# Patient Record
Sex: FEMALE
[De-identification: names, ages, dates, MRNs, and addresses within clinical notes are randomized per-mention and may not be internally consistent; named-entity substitution may affect disease eponyms.]

---

## 2022-05-06 ENCOUNTER — HOSPITAL ENCOUNTER (OUTPATIENT)
Dept: HOSPITAL 90 - RAH | Age: 67
End: 2022-05-06
Attending: PHYSICIAN ASSISTANT
Payer: MEDICARE

## 2022-05-06 DIAGNOSIS — M48.061: Primary | ICD-10-CM

## 2022-05-06 DIAGNOSIS — M47.816: ICD-10-CM

## 2022-05-06 PROCEDURE — 72114 X-RAY EXAM L-S SPINE BENDING: CPT

## 2023-02-09 ENCOUNTER — HOSPITAL ENCOUNTER (OUTPATIENT)
Dept: HOSPITAL 90 - RAH | Age: 68
Discharge: HOME | End: 2023-02-09
Attending: FAMILY MEDICINE
Payer: MEDICARE

## 2023-02-09 DIAGNOSIS — R10.11: ICD-10-CM

## 2023-02-09 DIAGNOSIS — N64.4: Primary | ICD-10-CM

## 2023-02-09 PROCEDURE — 76705 ECHO EXAM OF ABDOMEN: CPT

## 2023-02-14 ENCOUNTER — HOSPITAL ENCOUNTER (OUTPATIENT)
Dept: HOSPITAL 90 - RAH | Age: 68
Discharge: HOME | End: 2023-02-14
Attending: FAMILY MEDICINE
Payer: MEDICARE

## 2023-02-14 DIAGNOSIS — N64.4: Primary | ICD-10-CM

## 2023-04-05 ENCOUNTER — HOSPITAL ENCOUNTER (OUTPATIENT)
Dept: HOSPITAL 90 - RAH | Age: 68
Discharge: HOME | End: 2023-04-05
Attending: PHYSICIAN ASSISTANT
Payer: MEDICARE

## 2023-04-05 DIAGNOSIS — M48.07: ICD-10-CM

## 2023-04-05 DIAGNOSIS — M54.50: ICD-10-CM

## 2023-04-05 DIAGNOSIS — M43.16: Primary | ICD-10-CM

## 2023-04-05 DIAGNOSIS — M41.26: ICD-10-CM

## 2023-04-05 PROCEDURE — 72110 X-RAY EXAM L-2 SPINE 4/>VWS: CPT

## 2025-03-03 ENCOUNTER — HOSPITAL ENCOUNTER (EMERGENCY)
Dept: HOSPITAL 90 - EDH | Age: 70
Discharge: HOME | End: 2025-03-03
Payer: MEDICARE

## 2025-03-03 VITALS
TEMPERATURE: 98.6 F | RESPIRATION RATE: 20 BRPM | HEART RATE: 74 BPM | OXYGEN SATURATION: 100 % | DIASTOLIC BLOOD PRESSURE: 74 MMHG | SYSTOLIC BLOOD PRESSURE: 126 MMHG

## 2025-03-03 VITALS — WEIGHT: 160.06 LBS | HEIGHT: 63 IN | BODY MASS INDEX: 28.36 KG/M2

## 2025-03-03 DIAGNOSIS — E78.00: ICD-10-CM

## 2025-03-03 DIAGNOSIS — E11.9: ICD-10-CM

## 2025-03-03 DIAGNOSIS — I10: ICD-10-CM

## 2025-03-03 DIAGNOSIS — M54.32: Primary | ICD-10-CM

## 2025-03-03 PROCEDURE — 96372 THER/PROPH/DIAG INJ SC/IM: CPT

## 2025-03-03 PROCEDURE — 99284 EMERGENCY DEPT VISIT MOD MDM: CPT

## 2025-03-03 RX ADMIN — TRIAMCINOLONE ACETONIDE ONE MG: 40 INJECTION, SUSPENSION INTRA-ARTICULAR; INTRAMUSCULAR at 22:53

## 2025-03-03 RX ADMIN — ORPHENADRINE CITRATE ONE MG: 30 INJECTION INTRAMUSCULAR; INTRAVENOUS at 22:53

## 2025-03-03 NOTE — ERN
ED Note


History of Present Illness


Stated Complaint:  PAIN, HIGH BP


Chief Complaint:  Lower Extremity Pain/Injury


Time Seen by MD:  20:02


Time Seen by Midlevel:  20:02


Dictation:


The patient is a 69-year-old female with a history of diabetes, hypertension who

presents to the emergency department with left lower leg pain.  Patient reports 

that she has been dealing with this sciatic nerve pain for over a year but today

got worse.  Reports she has been seeing her primary doctor for this problem but 

is pending review of results of imaging with .  Patient denies any recent 

traumas, denies any urinary or fecal incontinence


Allergies:  


Coded Allergies:  


     No Known Allergies (Unverified  Allergy, Unknown, 3/3/25)





Past Medical History


Past Medical History:  Diabetes-Type II, High Cholesterol, Hypertension


Surgical History:  Unknown


RN Note Reviewed/Agreed w/PFSH:  Yes





Review of System


Dictation


Constitutional: Negative for fever,chills, and weight loss


Eyes: Negative for injury, pain,redness, and discharge


ENT: Negative for injury,pain or swelling


Cardiovascular: Negative for chest pain, palpitations, and edema


Respiratory: Negative for shortness of breath, cough, and wheezing, 


Abdomen/GI: Negative for abdominal pain, nausea, vomiting, diarrhea, and 

constipation


Back: Negative for injury and pain


: Negative for injury, bleeding and discharge


MS/Extremity: Negative for injury and deformity positive for left lower leg pain


Skin: Negative for rash, and discoloration


Neuro: Negative for headache, weakness, numbness, tingling, and seizure 


Psych: Negative for suicide ideation, homicidal ideation, and hallucinations





Initial Vital Sign


VS





                                   Vital Signs








  Date Time  Temp Pulse Resp B/P (MAP) Pulse Ox O2 Delivery O2 Flow Rate FiO2


 


3/3/25 20:16 98.6 64 20  96 Room Air  


 


3/3/25 22:48    126/74   0 21











Physical Exam


Dictation


Vital Signs reviewed 


General Appearance: Alert, oriented x 3, no acute distress, well developed, 

nourished. 


Head and Face: non-traumatic.


Eyes: PERRL, pink conjunctivas, eyelid no trauma, anterior chamber with arcus 

senilis. 


Ears: Pinnas intact and no signs of trauma or erythema ear canals clear and no 

discharge TM no erythema 


Nose: No discharge, no bleeding. 


Oropharynx: Mouth normal, tongue pink.


pharynx clear,no erythema, tonsils no exudates, no abscesses noted,  mucous 

membrane moist 


Neck: Supple, non-tender, no thyromegaly, no masses, no JVD, no bruits 


Breast:Deferred 


Chest:No tenderness, no crepitus, no paradoxical movement, no retractions 


Lungs:Clear, well-ventilated, symmetric, no rales, no wheezing, no rhonchi, no 

stridor, good breath sounds bilaterally 


Heart: Regular rate, regular rhythm, no murmur, no gallops 


Vascular: no peripheral edema, left dorsalis pedis 3+.


Abdomen: Soft, positive bowel sounds, nondistended, no guarding, 


nontender, no rebound, no masses no hepatomegaly, no splenomegaly, no Marcelo's 

sign, no hernias.


Rectal: Deferred


Genital: Deferred


Neurological: Normal speech,  motor function intact, sensory function intact 


Musculoskeletal: Neck nontender, full range of motion, back nontender, full 

range of motion,


Extremities: nontender, full range of motion , left buttocks tenderness, full 

range of motion, no wounds, cap refill less than 3 seconds


Skin: Color pink, dry, no turgor, no rash, no lacerations, no abrasions, no 

contusions.


Lymphatic: Deferred





Results (Laboratory/Radiology)


Labs Reviewed?:  Yes





ED Course


ED Course





Orders








Procedure Category Date Status





  Time 


 


Ketorolac 60mg/2ml PHA 3/3/25 Complete





 (Toradol 60mg/2ml)  20:30 


 


Triamcinolone Acet PHA 3/3/25 Complete





40mg/Ml 1ml (Kenalog  20:30 


 


Orphenadrine Citrate PHA 3/3/25 Complete





 (Norflex)  20:30 








Current Medications








 Medications


  (Trade)  Dose


 Ordered  Sig/Abi


 Route


 PRN Reason  Start Time


 Stop Time Status Last Admin


Dose Admin


 


 Ketorolac


 Tromethamine


  (toRADol 60MG/


 2ML)  30 mg  ONCE  ONCE


 IM


   3/3/25 20:30


 3/3/25 20:33 DC 3/3/25 22:53





 


 Orphenadrine


 Citrate


  (Norflex)  60 mg  ONCE  ONCE


 IM


   3/3/25 20:30


 3/3/25 20:34 DC 3/3/25 22:53





 


 Triamcinolone


 Acetonide


  (Kenalog 40)  40 mg  ONCE  ONCE


 IM


   3/3/25 20:30


 3/3/25 20:33 DC 3/3/25 22:53











Vital Signs








  Date Time  Temp Pulse Resp B/P (MAP) Pulse Ox O2 Delivery O2 Flow Rate FiO2


 


3/3/25 22:48 98.6 74 20 126/74 100 Room Air* 0 21


 


3/3/25 20:16 98.6 64 20  96 Room Air  











Medical Decision Making


MDM


The patient is a 69-year-old female with a history of diabetes, hypertension who

presents to the emergency department with left lower leg pain.  Patient reports 

that she has been dealing with this sciatic nerve pain for over a year but today

got worse.  Reports she has been seeing her primary doctor for this problem but 

is pending review of results of imaging with DrMary Ellen.  Patient denies any recent tr

aumas, denies any urinary or fecal incontinence


Patient treated for chronic leg pain.  Most likely related for sciatic nerve magnolia

n.  Patient has tenderness to left buttocks and radiates down her leg, 

neurovascularly intact.  Patient will be discharged to follow up with primary 

doctor for further management.


Differential diagnosis:  Sciatic nerve pain, leg contusion, piriformis syndrome.





Need for hospitalization: Patient does not meet criteria for hospitalization.





There are no social concerns with this patient.





DX & DISP


Disposition:  Discharge


Departure


Impression:  


   Primary Impression:  Sciatic nerve pain


Condition:  Stable


Scripts


Cyclobenzaprine HCl (Flexeril) 10 Mg Tab


5 MG PO TID for muscle sstiffness, #10 TAB 0 Refills


   Prov: CHANTALE LEE FNP         3/3/25





Additional Instructions:  


Please follow up with your primary doctor in 1-2 days.  If symptoms worsen p

lease return to ER.


FOLLOW-UP WITH PRIMARY CARE PROVIDER IN 1 TO 2 DAYS.  TAKE MEDICATIONS AS 

DIRECTED HERE IN THE EMERGENCY ROOM.  OKAY TO CONTINUE HOME MEDICATIONS UNLESS 

OTHERWISE DISCUSSED DURING YOUR VISIT IN THE EMERGENCY ROOM TODAY.  RETURN TO 

YOUR NEAREST EMERGENCY ROOM IF SYMPTOMS WORSEN OR IF THERE IS NO IMPROVEMENT.  

CALL 911 IF YOU NEED IMMEDIATE ASSISTANCE.  TAKE TYLENOL OR MOTRIN 

OVER-THE-COUNTER AS NEEDED AND IF NO CONTRAINDICATIONS ARE PRESENT.  INCREASE 

ORAL HYDRATION.  A WOUND CULTURE OR URINE CULTURE WAS ORDERED HERE IN THE 

EMERGENCY ROOM DEPARTMENT PLEASE FOLLOW-UP WITH PRIMARY CARE PROVIDER AND ADVISE

THEM TO GET REPEAT PORTS FROM OUR FACILITY.  IF YOU HAD ANY ACE WRAP/SPLINTS 

THAT WERE APPLIED HERE, PLEASE DO NOT REMOVE THEM UNTIL YOU SEE YOUR PRIMARY 

CARE OR SPECIALTY.


Referrals:  


TU GARCIA MD (PCP)


Time of Disposition:  23:05


I have reviewed the case, and I agree with, Diagnosis and Plan











CHANTALE LEE [facility]                Mar 3, 2025 23:00